# Patient Record
Sex: FEMALE | Race: WHITE | Employment: FULL TIME | URBAN - METROPOLITAN AREA
[De-identification: names, ages, dates, MRNs, and addresses within clinical notes are randomized per-mention and may not be internally consistent; named-entity substitution may affect disease eponyms.]

---

## 2017-02-14 ENCOUNTER — OFFICE VISIT (OUTPATIENT)
Dept: FAMILY MEDICINE CLINIC | Age: 42
End: 2017-02-14

## 2017-02-14 VITALS
BODY MASS INDEX: 22.67 KG/M2 | HEART RATE: 76 BPM | SYSTOLIC BLOOD PRESSURE: 90 MMHG | WEIGHT: 132 LBS | TEMPERATURE: 98.5 F | RESPIRATION RATE: 12 BRPM | DIASTOLIC BLOOD PRESSURE: 60 MMHG

## 2017-02-14 DIAGNOSIS — Z91.89 AT RISK FOR INFECTIOUS DISEASE DUE TO RECENT FOREIGN TRAVEL: ICD-10-CM

## 2017-02-14 DIAGNOSIS — Z23 NEED FOR HEPATITIS A VACCINATION: ICD-10-CM

## 2017-02-14 DIAGNOSIS — G43.009 MIGRAINE WITHOUT AURA AND WITHOUT STATUS MIGRAINOSUS, NOT INTRACTABLE: Primary | ICD-10-CM

## 2017-02-14 DIAGNOSIS — Z23 NEED FOR TYPHUS VACCINATION: ICD-10-CM

## 2017-02-14 PROCEDURE — 90632 HEPA VACCINE ADULT IM: CPT | Performed by: FAMILY MEDICINE

## 2017-02-14 PROCEDURE — 99214 OFFICE O/P EST MOD 30 MIN: CPT | Performed by: FAMILY MEDICINE

## 2017-02-14 PROCEDURE — 90471 IMMUNIZATION ADMIN: CPT | Performed by: FAMILY MEDICINE

## 2017-02-14 RX ORDER — SUMATRIPTAN 100 MG/1
100 TABLET, FILM COATED ORAL
Qty: 9 TABLET | Refills: 12 | Status: SHIPPED | OUTPATIENT
Start: 2017-02-14 | End: 2018-02-25 | Stop reason: SDUPTHER

## 2017-02-14 RX ORDER — KETOROLAC TROMETHAMINE 10 MG/1
10 TABLET, FILM COATED ORAL EVERY 6 HOURS PRN
Qty: 20 TABLET | Refills: 5 | Status: SHIPPED | OUTPATIENT
Start: 2017-02-14 | End: 2018-02-26 | Stop reason: SDUPTHER

## 2017-02-14 RX ORDER — PROCHLORPERAZINE MALEATE 10 MG
10 TABLET ORAL EVERY 6 HOURS PRN
Qty: 20 TABLET | Refills: 5 | Status: SHIPPED | OUTPATIENT
Start: 2017-02-14 | End: 2018-02-26 | Stop reason: SDUPTHER

## 2017-02-14 RX ORDER — CIPROFLOXACIN 500 MG/1
500 TABLET, FILM COATED ORAL 2 TIMES DAILY
Qty: 14 TABLET | Refills: 0 | Status: SHIPPED | OUTPATIENT
Start: 2017-02-14 | End: 2017-02-21

## 2017-02-14 RX ORDER — ATOVAQUONE AND PROGUANIL HYDROCHLORIDE 250; 100 MG/1; MG/1
1 TABLET, FILM COATED ORAL DAILY
Qty: 23 TABLET | Refills: 0 | Status: SHIPPED | OUTPATIENT
Start: 2017-02-14 | End: 2017-05-10 | Stop reason: SDUPTHER

## 2017-05-05 DIAGNOSIS — Z91.89 AT RISK FOR INFECTIOUS DISEASE DUE TO RECENT FOREIGN TRAVEL: ICD-10-CM

## 2017-05-10 RX ORDER — ATOVAQUONE AND PROGUANIL HYDROCHLORIDE 250; 100 MG/1; MG/1
1 TABLET, FILM COATED ORAL DAILY
Qty: 23 TABLET | Refills: 0 | Status: SHIPPED | OUTPATIENT
Start: 2017-05-10 | End: 2017-06-16 | Stop reason: ALTCHOICE

## 2017-06-16 ENCOUNTER — OFFICE VISIT (OUTPATIENT)
Dept: FAMILY MEDICINE CLINIC | Age: 42
End: 2017-06-16

## 2017-06-16 ENCOUNTER — TELEPHONE (OUTPATIENT)
Dept: FAMILY MEDICINE CLINIC | Age: 42
End: 2017-06-16

## 2017-06-16 VITALS
OXYGEN SATURATION: 98 % | SYSTOLIC BLOOD PRESSURE: 115 MMHG | DIASTOLIC BLOOD PRESSURE: 78 MMHG | WEIGHT: 136 LBS | BODY MASS INDEX: 23.36 KG/M2 | HEART RATE: 80 BPM

## 2017-06-16 DIAGNOSIS — R53.83 OTHER FATIGUE: ICD-10-CM

## 2017-06-16 DIAGNOSIS — Z78.9 RECENT FOREIGN TRAVEL: ICD-10-CM

## 2017-06-16 DIAGNOSIS — R42 LIGHTHEADED: Primary | ICD-10-CM

## 2017-06-16 PROCEDURE — 99213 OFFICE O/P EST LOW 20 MIN: CPT | Performed by: FAMILY MEDICINE

## 2018-02-25 DIAGNOSIS — G43.009 MIGRAINE WITHOUT AURA AND WITHOUT STATUS MIGRAINOSUS, NOT INTRACTABLE: ICD-10-CM

## 2018-02-25 RX ORDER — SUMATRIPTAN 100 MG/1
100 TABLET, FILM COATED ORAL
Qty: 9 TABLET | Refills: 12 | Status: SHIPPED | OUTPATIENT
Start: 2018-02-25 | End: 2019-03-11 | Stop reason: SDUPTHER

## 2018-02-26 DIAGNOSIS — G43.009 MIGRAINE WITHOUT AURA AND WITHOUT STATUS MIGRAINOSUS, NOT INTRACTABLE: ICD-10-CM

## 2018-02-26 RX ORDER — SUMATRIPTAN 100 MG/1
100 TABLET, FILM COATED ORAL
Qty: 9 TABLET | Refills: 12 | Status: CANCELLED | OUTPATIENT
Start: 2018-02-26 | End: 2018-02-26

## 2018-02-26 RX ORDER — KETOROLAC TROMETHAMINE 10 MG/1
10 TABLET, FILM COATED ORAL EVERY 6 HOURS PRN
Qty: 20 TABLET | Refills: 1 | Status: SHIPPED | OUTPATIENT
Start: 2018-02-26 | End: 2018-06-26 | Stop reason: SDUPTHER

## 2018-02-26 RX ORDER — PROCHLORPERAZINE MALEATE 10 MG
10 TABLET ORAL EVERY 6 HOURS PRN
Qty: 20 TABLET | Refills: 1 | Status: SHIPPED | OUTPATIENT
Start: 2018-02-26 | End: 2018-06-26 | Stop reason: SDUPTHER

## 2018-05-11 ENCOUNTER — TELEPHONE (OUTPATIENT)
Dept: FAMILY MEDICINE CLINIC | Age: 43
End: 2018-05-11

## 2018-05-11 ENCOUNTER — OFFICE VISIT (OUTPATIENT)
Dept: FAMILY MEDICINE CLINIC | Age: 43
End: 2018-05-11

## 2018-05-11 VITALS
BODY MASS INDEX: 23.91 KG/M2 | TEMPERATURE: 96.8 F | DIASTOLIC BLOOD PRESSURE: 63 MMHG | RESPIRATION RATE: 16 BRPM | HEART RATE: 81 BPM | WEIGHT: 139.2 LBS | OXYGEN SATURATION: 99 % | SYSTOLIC BLOOD PRESSURE: 103 MMHG

## 2018-05-11 DIAGNOSIS — Z23 NEED FOR HEPATITIS A VACCINATION: ICD-10-CM

## 2018-05-11 DIAGNOSIS — Z71.84 TRAVEL ADVICE ENCOUNTER: ICD-10-CM

## 2018-05-11 DIAGNOSIS — L30.9 DERMATITIS: ICD-10-CM

## 2018-05-11 DIAGNOSIS — G43.009 MIGRAINE WITHOUT AURA AND WITHOUT STATUS MIGRAINOSUS, NOT INTRACTABLE: Primary | ICD-10-CM

## 2018-05-11 PROCEDURE — 90471 IMMUNIZATION ADMIN: CPT | Performed by: FAMILY MEDICINE

## 2018-05-11 PROCEDURE — 90632 HEPA VACCINE ADULT IM: CPT | Performed by: FAMILY MEDICINE

## 2018-05-11 PROCEDURE — 99214 OFFICE O/P EST MOD 30 MIN: CPT | Performed by: FAMILY MEDICINE

## 2018-05-11 RX ORDER — NARATRIPTAN 2.5 MG/1
TABLET ORAL
Qty: 12 TABLET | Refills: 5 | Status: SHIPPED | OUTPATIENT
Start: 2018-05-11 | End: 2019-05-20 | Stop reason: SDUPTHER

## 2018-05-11 RX ORDER — BETAMETHASONE DIPROPIONATE 0.5 MG/G
CREAM TOPICAL
Qty: 15 G | Refills: 1 | Status: SHIPPED | OUTPATIENT
Start: 2018-05-11 | End: 2018-06-10

## 2018-05-11 RX ORDER — CIPROFLOXACIN 500 MG/1
500 TABLET, FILM COATED ORAL 2 TIMES DAILY
Qty: 20 TABLET | Refills: 0 | Status: SHIPPED | OUTPATIENT
Start: 2018-05-11 | End: 2018-05-21

## 2018-05-11 RX ORDER — ATOVAQUONE AND PROGUANIL HYDROCHLORIDE 250; 100 MG/1; MG/1
1 TABLET, FILM COATED ORAL DAILY
Qty: 16 TABLET | Refills: 0 | Status: SHIPPED | OUTPATIENT
Start: 2018-05-11 | End: 2018-05-27

## 2019-03-11 DIAGNOSIS — G43.009 MIGRAINE WITHOUT AURA AND WITHOUT STATUS MIGRAINOSUS, NOT INTRACTABLE: ICD-10-CM

## 2019-03-13 RX ORDER — SUMATRIPTAN 100 MG/1
100 TABLET, FILM COATED ORAL
Qty: 9 TABLET | Refills: 1 | Status: SHIPPED | OUTPATIENT
Start: 2019-03-13 | End: 2019-05-20 | Stop reason: SDUPTHER

## 2019-05-20 ENCOUNTER — OFFICE VISIT (OUTPATIENT)
Dept: FAMILY MEDICINE CLINIC | Age: 44
End: 2019-05-20
Payer: COMMERCIAL

## 2019-05-20 VITALS
SYSTOLIC BLOOD PRESSURE: 108 MMHG | WEIGHT: 136.4 LBS | RESPIRATION RATE: 12 BRPM | DIASTOLIC BLOOD PRESSURE: 59 MMHG | BODY MASS INDEX: 23.43 KG/M2 | OXYGEN SATURATION: 98 % | TEMPERATURE: 97.3 F | HEART RATE: 71 BPM

## 2019-05-20 DIAGNOSIS — G43.009 MIGRAINE WITHOUT AURA AND WITHOUT STATUS MIGRAINOSUS, NOT INTRACTABLE: ICD-10-CM

## 2019-05-20 DIAGNOSIS — Z00.00 WELL ADULT EXAM: ICD-10-CM

## 2019-05-20 DIAGNOSIS — F33.41 RECURRENT MAJOR DEPRESSIVE DISORDER, IN PARTIAL REMISSION (HCC): Primary | ICD-10-CM

## 2019-05-20 DIAGNOSIS — M19.041 DEGENERATIVE ARTHRITIS OF FINGER, RIGHT: ICD-10-CM

## 2019-05-20 PROCEDURE — 99214 OFFICE O/P EST MOD 30 MIN: CPT | Performed by: FAMILY MEDICINE

## 2019-05-20 PROCEDURE — G8427 DOCREV CUR MEDS BY ELIG CLIN: HCPCS | Performed by: FAMILY MEDICINE

## 2019-05-20 PROCEDURE — G8420 CALC BMI NORM PARAMETERS: HCPCS | Performed by: FAMILY MEDICINE

## 2019-05-20 PROCEDURE — 1036F TOBACCO NON-USER: CPT | Performed by: FAMILY MEDICINE

## 2019-05-20 RX ORDER — SUMATRIPTAN 100 MG/1
100 TABLET, FILM COATED ORAL
Qty: 9 TABLET | Refills: 12 | Status: SHIPPED | OUTPATIENT
Start: 2019-05-20 | End: 2020-06-10

## 2019-05-20 RX ORDER — PROCHLORPERAZINE MALEATE 10 MG
10 TABLET ORAL EVERY 6 HOURS PRN
Qty: 20 TABLET | Refills: 12 | Status: SHIPPED | OUTPATIENT
Start: 2019-05-20 | End: 2020-08-20 | Stop reason: SDUPTHER

## 2019-05-20 RX ORDER — NARATRIPTAN 2.5 MG/1
TABLET ORAL
Qty: 12 TABLET | Refills: 5 | Status: SHIPPED | OUTPATIENT
Start: 2019-05-20 | End: 2020-08-20 | Stop reason: SDUPTHER

## 2019-05-20 RX ORDER — SUMATRIPTAN 100 MG/1
100 TABLET, FILM COATED ORAL
COMMUNITY
End: 2019-05-20

## 2019-05-20 RX ORDER — KETOROLAC TROMETHAMINE 10 MG/1
10 TABLET, FILM COATED ORAL EVERY 6 HOURS PRN
Qty: 20 TABLET | Refills: 12 | Status: SHIPPED | OUTPATIENT
Start: 2019-05-20 | End: 2020-08-20 | Stop reason: ALTCHOICE

## 2019-05-20 NOTE — PROGRESS NOTES
Subjective:      Patient ID: Eric Carmichael 41 y.o. female. The primary encounter diagnosis was Recurrent major depressive disorder, in partial remission ([address] Diagnoses of Migraine without aura and without status migrainosus, not intractable and Well adult exam were also pertinent to this visit. HPI    Migraines improved with stopping diet coke and exercising more. Taking Amerge preventatively before menses is very effective. Has to take an Imitrex 4 times a month for acute migraine - it works better for acute migraines than Amerge. Sleeps pretty well. Takes Toradol and Compazine if Imitrex is not effective or if has a break through HA with Amerge. Has not had any depression. No appetite or sleep disturbance, no anhedonia, Not suicidal.  Feels well. Has a knot on the right ring finger at the PIP. Mild stiffness. No known trauma or injury. Outpatient Medications Marked as Taking for the 5/20/19 encounter (Office Visit) with Joe Kam MD   Medication Sig Dispense Refill    SUMAtriptan (IMITREX) 100 MG tablet Take 100 mg by mouth once as needed for Migraine      ketorolac (TORADOL) 10 MG tablet TAKE 1 TABLET BY MOUTH EVERY 6 HOURS AS NEEDED FOR PAIN 20 tablet 5    prochlorperazine (COMPAZINE) 10 MG tablet TAKE 1 TABLET BY MOUTH EVERY 6 HOURS AS NEEDED (NAUSEA) 20 tablet 5    naratriptan (AMERGE) 2.5 MG tablet 2.5 mg at onset of HA,repeat in 4 hours if needed andTake 1 po qd x 7 days before menses 12 tablet 5    ibuprofen (ADVIL;MOTRIN) 200 MG CAPS Take 4 capsules by mouth 3 times daily as needed.  aspirin-acetaminophen-caffeine (EXCEDRIN MIGRAINE) 250-250-65 MG per tablet Take 1 tablet by mouth every 6 hours as needed.             Allergies   Allergen Reactions    Topamax [Topiramate]      Joint pain       Patient Active Problem List   Diagnosis    Migraine    Mass of finger of left hand    Depression       Past Medical History:   Diagnosis Date    Depression during divorce    Gestational diabetes 11/18/2011       Past Surgical History:   Procedure Laterality Date    ENDOMETRIAL ABLATION  02/13/2017    Dr. Frank Gilliland        Family History   Problem Relation Age of Onset   [facility] Migraines Mother    L[place] Sos Migraines Sister     Other Father         melanoma    Prostate Cancer Father     Hypertension Mother     Elevated Lipids Mother     Hypertension Father     Elevated Lipids Father        Social History     Tobacco Use    Smoking status: Never Smoker    Smokeless tobacco: Never Used   Substance Use Topics    Alcohol use: Yes     Comment: infrequent    Drug use: No            Review of Systems  Review of Systems    Objective:   Physical Exam  Vitals:    05/20/19 0918   BP: (!) 108/59   Pulse: 71   Resp: 12   Temp: 97.3 °F (36.3 °C)   TempSrc: Oral   SpO2: 98%   Weight: 136 lb 6.4 oz (61.9 kg)       Physical Exam  NAD  Skin is warm and dry. Mood and affect are normal.  No agitation or psychomotor retardation. No pressured speech, grandiosity or tangential thoughts. Insight and judgement are intact. Not suicidal.   The neck is supple and free of adenopathy or masses, the thyroid is normal without enlargement or nodules. Chest is clear, no wheezing or rales. Normal symmetric air entry throughout both lung fields. Heart regular with normal rate, no murmer or gallop     + Herbenden's nodule right fing finger   Assessment:       Diagnosis Orders   1. Recurrent major depressive disorder, in partial remission ([address]  Resolved    2. Migraine without aura and without status migrainosus, not intractable  ketorolac (TORADOL) 10 MG tablet    prochlorperazine (COMPAZINE) 10 MG tablet    naratriptan (AMERGE) 2.5 MG tablet    SUMAtriptan (IMITREX) 100 MG tablet  Well managed   3. Well adult exam  Comprehensive Metabolic Panel    Lipid Panel    CBC    TSH with Reflex   4. Degenerative arthritis of finger, right  Reassured. Topical analgesia.          Side effects of current medications reviewed and questions answered. Follow up in 1 year or prn. Plan:      Follow up in 1 year or prn.

## 2019-10-07 PROBLEM — N20.0 NEPHROLITHIASIS: Status: ACTIVE | Noted: 2019-10-07

## 2019-10-10 ENCOUNTER — TELEPHONE (OUTPATIENT)
Dept: FAMILY MEDICINE CLINIC | Age: 44
End: 2019-10-10

## 2020-08-18 ENCOUNTER — NURSE TRIAGE (OUTPATIENT)
Dept: OTHER | Facility: CLINIC | Age: 45
End: 2020-08-18

## 2020-08-18 NOTE — TELEPHONE ENCOUNTER
Reason for Disposition   [1] MILD swelling of both ankles (i.e., pedal edema) AND [2] new onset or worsening    Answer Assessment - Initial Assessment Questions  1. ONSET: \"When did the swelling start? \" (e.g., minutes, hours, days)      Week ago  2. LOCATION: \"What part of the leg is swollen? \"  \"Are both legs swollen or just one leg? \"      Both legs  3. SEVERITY: \"How bad is the swelling? \" (e.g., localized; mild, moderate, severe)   - Localized - small area of swelling localized to one leg   - MILD pedal edema - swelling limited to foot and ankle, pitting edema < 1/4 inch (6 mm) deep, rest and elevation eliminate most or all swelling   - MODERATE edema - swelling of lower leg to knee, pitting edema > 1/4 inch (6 mm) deep, rest and elevation only partially reduce swelling   - SEVERE edema - swelling extends above knee, facial or hand swelling present       mild  4. REDNESS: \"Does the swelling look red or infected? \"      no  5. PAIN: \"Is the swelling painful to touch? \" If so, ask: \"How painful is it? \"   (Scale 1-10; mild, moderate or severe)      no  6. FEVER: \"Do you have a fever? \" If so, ask: \"What is it, how was it measured, and when did it start? \"       no  7. CAUSE: \"What do you think is causing the leg swelling? \"      Maybe kidney stones  8. MEDICAL HISTORY: \"Do you have a history of heart failure, kidney disease, liver failure, or cancer? \"      Kidney stones   9. RECURRENT SYMPTOM: \"Have you had leg swelling before? \" If so, ask: \"When was the last time? \" \"What happened that time? \"      Yes had bilateral edema in legs May of 2019 then had kidney stones in September   10. OTHER SYMPTOMS: \"Do you have any other symptoms? \" (e.g., chest pain, difficulty breathing)        Lower back pain  11. PREGNANCY: \"Is there any chance you are pregnant? \" \"When was your last menstrual period? \"        n/a    Protocols used: LEG SWELLING AND EDEMA-ADULT-

## 2020-08-20 ENCOUNTER — VIRTUAL VISIT (OUTPATIENT)
Dept: FAMILY MEDICINE CLINIC | Age: 45
End: 2020-08-20
Payer: COMMERCIAL

## 2020-08-20 PROCEDURE — 99214 OFFICE O/P EST MOD 30 MIN: CPT | Performed by: FAMILY MEDICINE

## 2020-08-20 PROCEDURE — G8427 DOCREV CUR MEDS BY ELIG CLIN: HCPCS | Performed by: FAMILY MEDICINE

## 2020-08-20 RX ORDER — PROCHLORPERAZINE MALEATE 10 MG
10 TABLET ORAL EVERY 6 HOURS PRN
Qty: 20 TABLET | Refills: 12 | Status: SHIPPED | OUTPATIENT
Start: 2020-08-20 | End: 2022-08-16

## 2020-08-20 RX ORDER — TRAMADOL HYDROCHLORIDE 50 MG/1
50 TABLET ORAL EVERY 6 HOURS PRN
Qty: 10 TABLET | Refills: 0 | Status: SHIPPED | OUTPATIENT
Start: 2020-08-20 | End: 2020-08-27

## 2020-08-20 RX ORDER — NARATRIPTAN 2.5 MG/1
TABLET ORAL
Qty: 12 TABLET | Refills: 5 | Status: SHIPPED | OUTPATIENT
Start: 2020-08-20 | End: 2021-09-09

## 2020-08-20 NOTE — PROGRESS NOTES
02/12/2016    BILITOT 0.6 02/12/2016    ALKPHOS 57 02/12/2016    AST 11 (L) 02/12/2016    ALT 7 (L) 02/12/2016    LABGLOM >60 02/12/2016    GFRAA >60 02/12/2016    AGRATIO 2.1 02/12/2016    GLOB 2.2 02/12/2016        Lab Results   Component Value Date    WBC 9.8 02/12/2016    HGB 13.7 02/12/2016    HCT 42.8 02/12/2016    MCV 89.9 02/12/2016     02/12/2016     TSH   Date Value Ref Range Status   02/12/2016 0.47 0.27 - 4.20 uIU/mL Final         Review of Systems    Outpatient Medications Marked as Taking for the 8/20/20 encounter (Virtual Visit) with Tracie Mendez MD   Medication Sig Dispense Refill    SUMAtriptan (IMITREX) 100 MG tablet One tablet at the onset of a migraine; repeat in 2 hours prn; max 2 tabs in 24 hours. 9 tablet 5    prochlorperazine (COMPAZINE) 10 MG tablet Take 1 tablet by mouth every 6 hours as needed (nausea) 20 tablet 12    naratriptan (AMERGE) 2.5 MG tablet Take 1 po qd x 7 days before menses 12 tablet 5    ibuprofen (ADVIL;MOTRIN) 200 MG CAPS Take 4 capsules by mouth 3 times daily as needed.  aspirin-acetaminophen-caffeine (EXCEDRIN MIGRAINE) 250-250-65 MG per tablet Take 1 tablet by mouth every 6 hours as needed.             Social History     Tobacco Use    Smoking status: Never Smoker    Smokeless tobacco: Never Used   Substance Use Topics    Alcohol use: Yes     Comment: infrequent    Drug use: No        Allergies   Allergen Reactions    Topamax [Topiramate]      Joint pain   ,   Past Medical History:   Diagnosis Date    Depression     during divorce    Gestational diabetes 11/18/2011   ,   Past Surgical History:   Procedure Laterality Date    ENDOMETRIAL ABLATION  02/13/2017    Dr. Rosamaria Shepherd   ,   Family History   Problem Relation Age of Onset   [facility] Migraines Mother     Migraines Sister     Other Father         melanoma    Prostate Cancer Father     Hypertension Mother     Elevated Lipids Mother     Hypertension Father     Elevated Lipids Father PHYSICAL EXAMINATION:  [ INSTRUCTIONS:  \"[x]\" Indicates a positive item  \"[]\" Indicates a negative item  -- DELETE ALL ITEMS NOT EXAMINED]  Vital Signs: (As obtained by patient/caregiver or practitioner observation)    Blood pressure-  Heart rate-    Respiratory rate-    Temperature-  Pulse oximetry-   Wt 130 lbs    Wt Readings from Last 3 Encounters:   05/20/19 136 lb 6.4 oz (61.9 kg)   05/11/18 139 lb 3.2 oz (63.1 kg)   06/16/17 136 lb (61.7 kg)     Temp Readings from Last 3 Encounters:   05/20/19 97.3 °F (36.3 °C) (Oral)   05/11/18 96.8 °F (36 °C) (Oral)   02/14/17 98.5 °F (36.9 °C) (Oral)     BP Readings from Last 3 Encounters:   05/20/19 (!) 108/59   05/11/18 103/63   06/16/17 115/78     Pulse Readings from Last 3 Encounters:   05/20/19 71   05/11/18 81   06/16/17 80        Constitutional: [x] Appears well-developed and well-nourished [] No apparent distress      [] Abnormal-   Mental status  [x] Alert and awake  [x] Oriented to person/place/time []Able to follow commands      Eyes:  EOM    [x]  Normal  [] Abnormal-  Sclera  []  Normal  [] Abnormal -         Discharge []  None visible  [] Abnormal -    HENT:   [x] Normocephalic, atraumatic.   [] Abnormal   [] Mouth/Throat: Mucous membranes are moist.         Neck: [x] No visualized mass     Pulmonary/Chest: [x] Respiratory effort normal.  [] No visualized signs of difficulty breathing or respiratory distress        [] Abnormal-      Musculoskeletal:   [x] Normal gait with no signs of ataxia   Full AROM back        [] Normal range of motion of neck        [] Abnormal-       Neurological:        [x] No Facial Asymmetry (Cranial nerve 7 motor function) (limited exam to video visit)          [] No gaze palsy        [] Abnormal-         Skin:        [x] No significant exanthematous lesions or discoloration noted on facial skin         [] Abnormal-            Psychiatric:       [x] Normal Affect [] No Hallucinations        [] Abnormal-     Other pertinent observable physical exam findings-     ASSESSMENT/PLAN:  1. Pedal edema  Likely stasis edema. < 2 gm sodium diet, support hose. Dyazide if renal fxn is normal.   - Comprehensive Metabolic Panel; Future    2. Migraine without aura and without status migrainosus, not intractable  Controlled  Controlled Substance Monitoring:    Acute and Chronic Pain Monitoring:   RX Monitoring 8/20/2020   Periodic Controlled Substance Monitoring Possible medication side effects, risk of tolerance/dependence & alternative treatments discussed. ;No signs of potential drug abuse or diversion identified. ;Assessed functional status. ;Obtaining appropriate analgesic effect of treatment. - prochlorperazine (COMPAZINE) 10 MG tablet; Take 1 tablet by mouth every 6 hours as needed (nausea)  Dispense: 20 tablet; Refill: 12  - naratriptan (AMERGE) 2.5 MG tablet; Take 1 po qd x 7 days before menses  Dispense: 12 tablet; Refill: 5  - traMADol (ULTRAM) 50 MG tablet; Take 1 tablet by mouth every 6 hours as needed for Pain for up to 7 days. Dispense: 10 tablet; Refill: 0    3. Well adult exam    - Comprehensive Metabolic Panel; Future  - TSH with Reflex; Future  - Lipid Panel; Future  - CBC; Future    4. Acute midline low back pain without sciatica  Exercise addressed. She will look up lower back stretches and exercise on line. PT if not better in a few weeks or prn worsening. Side effects of current medications reviewed and questions answered. Call or return to clinic prn if these symptoms worsen or fail to improve as anticipated. No follow-ups on file. Starleen Hatchet is a 39 y.o. female being evaluated by a Virtual Visit (video visit) encounter to address concerns as mentioned above. A caregiver was present when appropriate.  Due to this being a TeleHealth encounter (During WJWOX-86 public health emergency), evaluation of the following organ systems was limited: Vitals/Constitutional/EENT/Resp/CV/GI//MS/Neuro/Skin/Heme-Lymph-Imm. Pursuant to the emergency declaration under the [address] [place] [address] and the [employer] and [employer] Act, this Virtual Visit was conducted with patient's (and/or legal guardian's) consent, to reduce the patient's risk of exposure to COVID-19 and provide necessary medical care. The patient (and/or legal guardian) has also been advised to contact this office for worsening conditions or problems, and seek emergency medical treatment and/or call 911 if deemed necessary. Patient identification was verified at the start of the visit: Yes    Total time spent on this encounter: Not billed by time    Services were provided through a video synchronous discussion virtually to substitute for in-person clinic visit. Patient and provider were located at their individual homes. --Dipika Howell MD on 8/19/2020 at 9:22 PM    An electronic signature was used to authenticate this note.

## 2020-08-28 DIAGNOSIS — Z00.00 WELL ADULT EXAM: ICD-10-CM

## 2020-08-28 DIAGNOSIS — R60.0 PEDAL EDEMA: ICD-10-CM

## 2020-08-28 LAB
A/G RATIO: 2 (ref 1.1–2.2)
ALBUMIN SERPL-MCNC: 4.5 G/DL (ref 3.4–5)
ALP BLD-CCNC: 64 U/L (ref 40–129)
ALT SERPL-CCNC: 13 U/L (ref 10–40)
ANION GAP SERPL CALCULATED.3IONS-SCNC: 13 MMOL/L (ref 3–16)
AST SERPL-CCNC: 18 U/L (ref 15–37)
BILIRUB SERPL-MCNC: 0.8 MG/DL (ref 0–1)
BUN BLDV-MCNC: 16 MG/DL (ref 7–20)
CALCIUM SERPL-MCNC: 9.3 MG/DL (ref 8.3–10.6)
CHLORIDE BLD-SCNC: 104 MMOL/L (ref 99–110)
CHOLESTEROL, TOTAL: 246 MG/DL (ref 0–199)
CO2: 23 MMOL/L (ref 21–32)
CREAT SERPL-MCNC: 0.8 MG/DL (ref 0.6–1.1)
GFR AFRICAN AMERICAN: >60
GFR NON-AFRICAN AMERICAN: >60
GLOBULIN: 2.3 G/DL
GLUCOSE BLD-MCNC: 87 MG/DL (ref 70–99)
HCT VFR BLD CALC: 43.5 % (ref 36–48)
HDLC SERPL-MCNC: 65 MG/DL (ref 40–60)
HEMOGLOBIN: 14.4 G/DL (ref 12–16)
LDL CHOLESTEROL CALCULATED: 170 MG/DL
MCH RBC QN AUTO: 30.4 PG (ref 26–34)
MCHC RBC AUTO-ENTMCNC: 33.1 G/DL (ref 31–36)
MCV RBC AUTO: 91.8 FL (ref 80–100)
PDW BLD-RTO: 14.3 % (ref 12.4–15.4)
PLATELET # BLD: 315 K/UL (ref 135–450)
PMV BLD AUTO: 7.9 FL (ref 5–10.5)
POTASSIUM SERPL-SCNC: 5.4 MMOL/L (ref 3.5–5.1)
RBC # BLD: 4.74 M/UL (ref 4–5.2)
SODIUM BLD-SCNC: 140 MMOL/L (ref 136–145)
TOTAL PROTEIN: 6.8 G/DL (ref 6.4–8.2)
TRIGL SERPL-MCNC: 55 MG/DL (ref 0–150)
TSH REFLEX: 0.53 UIU/ML (ref 0.27–4.2)
VLDLC SERPL CALC-MCNC: 11 MG/DL
WBC # BLD: 7.9 K/UL (ref 4–11)

## 2021-01-07 ENCOUNTER — OFFICE VISIT (OUTPATIENT)
Dept: PRIMARY CARE CLINIC | Age: 46
End: 2021-01-07

## 2021-01-07 DIAGNOSIS — Z20.828 EXPOSURE TO SARS-ASSOCIATED CORONAVIRUS: Primary | ICD-10-CM

## 2021-01-07 NOTE — PATIENT INSTRUCTIONS

## 2021-01-07 NOTE — PROGRESS NOTES
Kaitlin Melendrez received a viral test for COVID-19. They were educated on isolation and quarantine as appropriate. For any symptoms, they were directed to seek care from their PCP, given contact information to establish with a doctor, directed to an urgent care or the emergency room.

## 2021-01-08 ENCOUNTER — OFFICE VISIT (OUTPATIENT)
Dept: PRIMARY CARE CLINIC | Age: 46
End: 2021-01-08
Payer: COMMERCIAL

## 2021-01-08 DIAGNOSIS — Z20.822 SUSPECTED COVID-19 VIRUS INFECTION: Primary | ICD-10-CM

## 2021-01-08 LAB — SARS-COV-2, NAA: NOT DETECTED

## 2021-01-08 PROCEDURE — G8428 CUR MEDS NOT DOCUMENT: HCPCS | Performed by: NURSE PRACTITIONER

## 2021-01-08 PROCEDURE — 99211 OFF/OP EST MAY X REQ PHY/QHP: CPT | Performed by: NURSE PRACTITIONER

## 2021-01-08 PROCEDURE — G8421 BMI NOT CALCULATED: HCPCS | Performed by: NURSE PRACTITIONER

## 2021-01-09 LAB — SARS-COV-2, NAA: NOT DETECTED

## 2021-07-28 ENCOUNTER — VIRTUAL VISIT (OUTPATIENT)
Dept: FAMILY MEDICINE CLINIC | Age: 46
End: 2021-07-28
Payer: COMMERCIAL

## 2021-07-28 DIAGNOSIS — Z00.00 WELL ADULT EXAM: ICD-10-CM

## 2021-07-28 DIAGNOSIS — G43.009 MIGRAINE WITHOUT AURA AND WITHOUT STATUS MIGRAINOSUS, NOT INTRACTABLE: Primary | ICD-10-CM

## 2021-07-28 PROCEDURE — 99214 OFFICE O/P EST MOD 30 MIN: CPT | Performed by: FAMILY MEDICINE

## 2021-07-28 PROCEDURE — G8427 DOCREV CUR MEDS BY ELIG CLIN: HCPCS | Performed by: FAMILY MEDICINE

## 2021-07-28 RX ORDER — GALCANEZUMAB 120 MG/ML
120 INJECTION, SOLUTION SUBCUTANEOUS
Qty: 1 PEN | Refills: 5 | Status: SHIPPED | OUTPATIENT
Start: 2021-07-28 | End: 2021-08-17 | Stop reason: SDUPTHER

## 2021-07-28 RX ORDER — DICLOFENAC SODIUM 75 MG/1
75 TABLET, DELAYED RELEASE ORAL 2 TIMES DAILY PRN
Qty: 60 TABLET | Refills: 5 | Status: SHIPPED | OUTPATIENT
Start: 2021-07-28

## 2021-07-28 RX ORDER — PROMETHAZINE HYDROCHLORIDE 25 MG/1
25 TABLET ORAL 4 TIMES DAILY PRN
Qty: 20 TABLET | Refills: 5 | Status: SHIPPED | OUTPATIENT
Start: 2021-07-28

## 2021-07-28 ASSESSMENT — PATIENT HEALTH QUESTIONNAIRE - PHQ9
1. LITTLE INTEREST OR PLEASURE IN DOING THINGS: 0
SUM OF ALL RESPONSES TO PHQ9 QUESTIONS 1 & 2: 0
SUM OF ALL RESPONSES TO PHQ QUESTIONS 1-9: 0
2. FEELING DOWN, DEPRESSED OR HOPELESS: 0

## 2021-07-28 NOTE — PROGRESS NOTES
2021    TELEHEALTH EVALUATION -- Audio/Visual (During GCBHN-91 public health emergency)    HPI:    Mily Arcos (:  1975) has requested an audio/video evaluation for the following concern(s):    The encounter diagnosis was Migraine without aura and without status migrainosus, not intractable. Migraines: currently managed with PRN Amerge and Compazine. Migraines are \"awful'. Having regular menses every 28 d. Has migraines for one week prior to menses. Has to take Amerge every day for 7 days to keep HA at [place] [address]. The past few cycles she has a severe HA with nausea, photophobia and phonophobia. Lasts 3 days. She has migraines at least 12 days a month. Was so severe last week she had to go to Urgent Care and had injection with Benadryl, Toradol, Phenergan to break the HA. Did not tolerated Topamax. Has trouble remembering to take something every day. Hyperlipidemia:  Lipids normally ok; were increased last year. She is exercising 5 days a week. She was on keto diet last years.         Lab Results   Component Value Date     2020    K 5.4 (H) 2020     2020    CO2 23 2020    BUN 16 2020    CREATININE 0.8 2020    GLUCOSE 87 2020    CALCIUM 9.3 2020    PROT 6.8 2020    LABALBU 4.5 2020    BILITOT 0.8 2020    ALKPHOS 64 2020    AST 18 2020    ALT 13 2020    LABGLOM >60 2020    GFRAA >60 2020    AGRATIO 2.0 2020    GLOB 2.3 2020        Lab Results   Component Value Date    WBC 7.9 2020    HGB 14.4 2020    HCT 43.5 2020    MCV 91.8 2020     2020     TSH   Date Value Ref Range Status   2020 0.53 0.27 - 4.20 uIU/mL Final   2016 0.47 0.27 - 4.20 uIU/mL Final     Lab Results   Component Value Date    CHOL 246 (H) 2020    CHOL 183 2012     Lab Results   Component Value Date    TRIG 55 2020    TRIG 94 2012 Lab Results   Component Value Date    HDL 65 (H) 08/28/2020    HDL 40 02/22/2012     Lab Results   Component Value Date    LDLCALC 170 (H) 08/28/2020    LDLCALC 124 (H) 02/22/2012     Lab Results   Component Value Date    LABVLDL 11 08/28/2020    LABVLDL 19 02/22/2012     No results found for: CHOLHDLRATIO      Review of Systems    Outpatient Medications Marked as Taking for the 7/28/21 encounter (Virtual Visit) with Theo Stevens MD   Medication Sig Dispense Refill    prochlorperazine (COMPAZINE) 10 MG tablet Take 1 tablet by mouth every 6 hours as needed (nausea) 20 tablet 12    naratriptan (AMERGE) 2.5 MG tablet Take 1 po qd x 7 days before menses 12 tablet 5    ibuprofen (ADVIL;MOTRIN) 200 MG CAPS Take 4 capsules by mouth 3 times daily as needed.  aspirin-acetaminophen-caffeine (EXCEDRIN MIGRAINE) 250-250-65 MG per tablet Take 1 tablet by mouth every 6 hours as needed.             Social History     Tobacco Use    Smoking status: Never Smoker    Smokeless tobacco: Never Used   Substance Use Topics    Alcohol use: Yes     Comment: infrequent    Drug use: No        Allergies   Allergen Reactions    Topamax [Topiramate]      Joint pain   ,   Past Medical History:   Diagnosis Date    Depression     during divorce    Gestational diabetes 11/18/2011       PHYSICAL EXAMINATION:  [ INSTRUCTIONS:  \"[x]\" Indicates a positive item  \"[]\" Indicates a negative item  -- DELETE ALL ITEMS NOT EXAMINED]  Vital Signs: (As obtained by patient/caregiver or practitioner observation)    Blood pressure-  Heart rate-    Respiratory rate-    Temperature-  Pulse oximetry-   Wt 136 lb     Wt Readings from Last 3 Encounters:   05/20/19 136 lb 6.4 oz (61.9 kg)   05/11/18 139 lb 3.2 oz (63.1 kg)   06/16/17 136 lb (61.7 kg)     Temp Readings from Last 3 Encounters:   05/20/19 97.3 °F (36.3 °C) (Oral)   05/11/18 96.8 °F (36 °C) (Oral)   02/14/17 98.5 °F (36.9 °C) (Oral)     BP Readings from Last 3 Encounters:   05/20/19 (!) 108/59   05/11/18 103/63   06/16/17 115/78     Pulse Readings from Last 3 Encounters:   05/20/19 71   05/11/18 81   06/16/17 80      Constitutional: [x] Appears well-developed and well-nourished [x] No apparent distress      [] Abnormal-   Mental status  [x] Alert and awake  [] Oriented to person/place/time [x]Able to follow commands      Eyes:  EOM    [x]  Normal  [] Abnormal-  Sclera  [x]  Normal  [] Abnormal -         Discharge [x]  None visible  [] Abnormal -    HENT:   [x] Normocephalic, atraumatic. [] Abnormal     Neck: [x] No visualized mass     Pulmonary/Chest: [x] Respiratory effort normal.  [x] No visualized signs of difficulty breathing or respiratory distress        [] Abnormal-       Neurological:        [x] No Facial Asymmetry (Cranial nerve 7 motor function) (limited exam to video visit)             Skin:        [x] No significant exanthematous lesions or discoloration noted on facial skin         [] Abnormal-            Psychiatric:       [x] Normal Affect        [] Abnormal-     Other pertinent observable physical exam findings-     ASSESSMENT/PLAN:  1. Migraine without aura and without status migrainosus, not intractable  Options addressed. If has migraine take Imitrex + Phenergan + Toradol. Life style addressed   - Galcanezumab-gnlm (EMGALITY) 120 MG/ML SOAJ; Inject 120 mg into the skin every 30 days  Dispense: 1 pen; Refill: 5  - diclofenac (VOLTAREN) 75 MG EC tablet; Take 1 tablet by mouth 2 times daily as needed for Pain (migraine)  Dispense: 60 tablet; Refill: 5  - promethazine (PHENERGAN) 25 MG tablet; Take 1 tablet by mouth 4 times daily as needed for Nausea  Dispense: 20 tablet; Refill: 5    2. Well adult exam  Agree with being off keto. Repeat lipids  - Comprehensive Metabolic Panel; Future  - Lipid Panel; Future  - TSH with Reflex; Future  - Hepatitis C Antibody; Future    Side effects of current medications reviewed and questions answered. Follow up in 3 months or prn.    No follow-ups on file. Margarita Rosales, was evaluated through a synchronous (real-time) audio-video encounter. The patient (or guardian if applicable) is aware that this is a billable service. Verbal consent to proceed has been obtained within the past 12 months. The visit was conducted pursuant to the emergency declaration under the [address] [place] [address], [address] [place] [address] and the [employer] and [facility] Act. Patient identification was verified, and a caregiver was present when appropriate. The patient was located in a state where the provider was credentialed to provide care. Total time spent on this encounter: Not billed by time    --Fredrick Stahl MD on 7/27/2021 at 9:35 PM    An electronic signature was used to authenticate this note.

## 2021-08-05 ENCOUNTER — TELEPHONE (OUTPATIENT)
Dept: FAMILY MEDICINE CLINIC | Age: 46
End: 2021-08-05

## 2021-08-05 ENCOUNTER — PATIENT MESSAGE (OUTPATIENT)
Dept: FAMILY MEDICINE CLINIC | Age: 46
End: 2021-08-05

## 2021-08-05 DIAGNOSIS — G43.009 MIGRAINE WITHOUT AURA AND WITHOUT STATUS MIGRAINOSUS, NOT INTRACTABLE: ICD-10-CM

## 2021-08-05 NOTE — TELEPHONE ENCOUNTER
From: Cain Faye  To: Tonya Montanez MD  Sent: 8/5/2021 3:07 PM EDT  Subject: Prescription Question    I recently had an appointment surrounding my migraines and the fact that Imitrex is rarely working anymore. We opted for 2 options: Try a pill combo of the shot combo I have received and try a preventative. I had a migraine and the combo pills did nothing - I repeated the pill combo within 2 hours of taking the first set. Did not help. The Emgality is still in process at the pharmacy - does that mean it is not covered? Can we get something else started? Also, I have had a few aura's lately where my vision becomes pixelated but a migraine does not always follow. Any relief you can help provide would be wonderful! Thanks.   Enmanuel Colunga

## 2021-08-06 NOTE — TELEPHONE ENCOUNTER
PA submitted via CM for Emgality 120MG/ML auto-injectors (migraine).   Key: [id number] - PA Case ID: [id number]    STATUS: PENDING

## 2021-08-10 NOTE — TELEPHONE ENCOUNTER
Received APPROVAL for Emgality 120MG/ML auto-injectors (migraine) through 02/06/2022. Approval letter attached. Please advise patient. Thank you!

## 2021-08-17 RX ORDER — GALCANEZUMAB 120 MG/ML
120 INJECTION, SOLUTION SUBCUTANEOUS
Qty: 1 PEN | Refills: 5 | Status: SHIPPED | OUTPATIENT
Start: 2021-08-17 | End: 2022-02-01

## 2021-08-17 NOTE — TELEPHONE ENCOUNTER
Emgality approved    Needs resent to pharmacy    Requested Prescriptions     Pending Prescriptions Disp Refills    Galcanezumab-gnlm (EMGALITY) 120 MG/ML SOAJ 1 pen 5     Sig: Inject 120 mg into the skin every 30 days

## 2021-12-06 DIAGNOSIS — G43.009 MIGRAINE WITHOUT AURA AND WITHOUT STATUS MIGRAINOSUS, NOT INTRACTABLE: ICD-10-CM

## 2021-12-06 RX ORDER — SUMATRIPTAN 100 MG/1
TABLET, FILM COATED ORAL
Qty: 9 TABLET | Refills: 12 | Status: SHIPPED | OUTPATIENT
Start: 2021-12-06

## 2021-12-06 NOTE — TELEPHONE ENCOUNTER
Requested Prescriptions     Pending Prescriptions Disp Refills    SUMAtriptan (IMITREX) 100 MG tablet [Pharmacy Med Name: SUMATRIPTAN SUCC 100 MG TABLET] 9 tablet 12     Sig: ONE TABLET AT THE ONSET OF A MIGRAINE REPEAT IN 2 HOURS AS NEEDED MAX 2 TABS IN 24 HOURS.     7/28/2021  Last ov 7/28/2021

## 2022-02-01 DIAGNOSIS — G43.009 MIGRAINE WITHOUT AURA AND WITHOUT STATUS MIGRAINOSUS, NOT INTRACTABLE: ICD-10-CM

## 2022-02-01 RX ORDER — GALCANEZUMAB 120 MG/ML
120 INJECTION, SOLUTION SUBCUTANEOUS
Qty: 1 PEN | Refills: 5 | Status: SHIPPED | OUTPATIENT
Start: 2022-02-01 | End: 2022-08-08

## 2022-02-01 NOTE — TELEPHONE ENCOUNTER
Requested Prescriptions     Pending Prescriptions Disp Refills    EMGALITY 120 MG/ML SOAJ [Pharmacy Med Name: EMGALITY 120 MG/ML PEN]  5     Sig: INJECT 120 MG INTO THE SKIN EVERY 30 DAYS     Last ov 7/28/21 vv  Last lab 7/28/21

## 2022-03-11 ENCOUNTER — TELEPHONE (OUTPATIENT)
Dept: FAMILY MEDICINE CLINIC | Age: 47
End: 2022-03-11

## 2022-03-11 NOTE — TELEPHONE ENCOUNTER
Patient called stating that there was a PA request sent for her Emgality 120 mg faxed to the office but there is no record of that in her chart. She says she is almost completely out of the medication and doesn't know if there will be any side effects if she misses any doses. She would like a call back concerning this matter.     [phone] (home)

## 2022-03-14 ENCOUNTER — TELEPHONE (OUTPATIENT)
Dept: ADMINISTRATIVE | Age: 47
End: 2022-03-14

## 2022-03-14 NOTE — TELEPHONE ENCOUNTER
Submitted PA for [facility]  Via CMM Key: [id number] STATUS: APPROVED effective  03/14/2022-03/14/2023    Please notify patient.  Thank you

## 2022-03-21 NOTE — TELEPHONE ENCOUNTER
Pt stated she called pharmacy and pharmacy stated they received no notification of approval of the medication. Please advise.

## 2022-08-08 DIAGNOSIS — G43.009 MIGRAINE WITHOUT AURA AND WITHOUT STATUS MIGRAINOSUS, NOT INTRACTABLE: ICD-10-CM

## 2022-08-08 RX ORDER — GALCANEZUMAB 120 MG/ML
120 INJECTION, SOLUTION SUBCUTANEOUS
Qty: 1 PEN | Refills: 0 | Status: SHIPPED | OUTPATIENT
Start: 2022-08-08 | End: 2022-08-25 | Stop reason: SDUPTHER

## 2022-08-08 NOTE — TELEPHONE ENCOUNTER
[phone] (home)   Patient called. No answer. LMOM for the patient to call and schedule an appointment. Sent [facility] also.

## 2022-08-08 NOTE — TELEPHONE ENCOUNTER
Requested Prescriptions     Pending Prescriptions Disp Refills    EMGALITY 120 MG/ML SOAJ [Pharmacy Med Name: EMGALITY 120 MG/ML PEN]  5     Sig: INJECT 120 MG INTO THE SKIN EVERY 30 DAYS     Last OV; 7/28/2021  Last labs; 7/28/2021  F/U 8/18/2022

## 2022-08-21 NOTE — PROGRESS NOTES
Subjective:        2022    TELEHEALTH EVALUATION -- Audio/Visual (During PTSYS-61 public health emergency)    HPI:    Derrell Hudson (:  1975) has requested an audio/video evaluation for the following concern(s):    The primary encounter diagnosis was Migraine without aura and without status migrainosus, not intractable. Diagnoses of Colon cancer screening, Need for Tdap vaccination, and Well adult exam were also pertinent to this visit. PAP: per gyne. Last done a few years ago. She will call to schedule   Colon cancer: due initial screening. FH is neg for polyps and colon cancer. Vaccines: due TDAP and COVID booster. Labs: due hep C. Migraines:  controlled with Emgality, Diclofenac, Phenergan, Imitrex (for breakthrough) and Amerge before menses. Much better. Has a migraine 6 - 8 times a month. For the first time in years she has not run out of Imitrex. She still has occ severe migraines and cannot always get the migraines under control when they occur. She takes Voltaren, Phenergan, Imitrex or Amerge when gets a migraine and still can have a HA for 6-8 hours. Menses are a bit irregular - coming about 5 days later than typical.    Elevated cholesterol: in  LDL was much higher than previous, likely due to keto diet.       Review of Systems    Outpatient Medications Marked as Taking for the 22 encounter (Telemedicine) with Nelly Kunz MD   Medication Sig Dispense Refill    Galcanezumab-gnlm (EMGALITY) 120 MG/ML SOAJ INJECT 120 MG INTO THE SKIN EVERY 30 DAYS 1 pen 0    SUMAtriptan (IMITREX) 100 MG tablet ONE TABLET AT THE ONSET OF A MIGRAINE REPEAT IN 2 HOURS AS NEEDED MAX 2 TABS IN 24 HOURS. 9 tablet 12    naratriptan (AMERGE) 2.5 MG tablet TAKE 1 TABLET BY MOUTH EVERY DAY FOR 7 DAYS BEFORE MENSES 12 tablet 12    diclofenac (VOLTAREN) 75 MG EC tablet Take 1 tablet by mouth 2 times daily as needed for Pain (migraine) 60 tablet 5    promethazine (PHENERGAN) 25 MG tablet [x] Respiratory effort normal.  [x] No visualized signs of difficulty breathing or respiratory distress        [] Abnormal-     Psychiatric:       [x] Normal Affect [x] No Hallucinations        [] Abnormal-     Other pertinent observable physical exam findings-     ASSESSMENT/PLAN:  1. Migraine without aura and without status migrainosus, not intractable  Improved but still not controlled. Try Nurtec, if effective consider stopping Emgality and switching to QOD Nurtec  - Galcanezumab-gnlm (EMGALITY) 120 MG/ML SOAJ; Inject 120 mg into the skin every 30 days  Dispense: 1 pen; Refill: 12    2. Colon cancer screening  Colonoscopy risks and benefits addressed and she agrees to schedule. - Carly Lopez MD, Gastroenterology, [place]    3. Need for Tdap vaccination  Will do at pharmacy or nurse visit    4. Well adult exam    - Hepatitis C Antibody; Future  - Comprehensive Metabolic Panel; Future  - CBC; Future  - TSH with Reflex; Future  - Lipid Panel; Future    5. Intractable migraine without aura and without status migrainosus    - Rimegepant Sulfate (NURTEC) 75 MG TBDP; Take 75 mg by mouth daily as needed (migraine)  Dispense: 8 tablet; Refill: 12      Side effects of current medications reviewed and questions answered. Follow up in 1 year or prn. No follow-ups on file. Marcos Ferreira, was evaluated through a synchronous (real-time) audio-video encounter. The patient (or guardian if applicable) is aware that this is a billable service, which includes applicable co-pays. This Virtual Visit was conducted with patient's (and/or legal guardian's) consent. The visit was conducted pursuant to the emergency declaration under the [facility], [address] [place] [address] [facility] authority and the [employer] General Act. Patient identification was verified, and a caregiver was present when appropriate.    The patient was located at Home: [facility] [address] [facility] [address]. Provider was located at Home ([place] [address]): [place]. Total time spent on this encounter: Not billed by time    --Galo Johnson MD on 8/21/2022 at 7:46 PM    An electronic signature was used to authenticate this note.

## 2022-08-25 ENCOUNTER — TELEMEDICINE (OUTPATIENT)
Dept: FAMILY MEDICINE CLINIC | Age: 47
End: 2022-08-25
Payer: COMMERCIAL

## 2022-08-25 DIAGNOSIS — Z23 NEED FOR TDAP VACCINATION: ICD-10-CM

## 2022-08-25 DIAGNOSIS — G43.009 MIGRAINE WITHOUT AURA AND WITHOUT STATUS MIGRAINOSUS, NOT INTRACTABLE: Primary | ICD-10-CM

## 2022-08-25 DIAGNOSIS — Z12.11 COLON CANCER SCREENING: ICD-10-CM

## 2022-08-25 DIAGNOSIS — Z00.00 WELL ADULT EXAM: ICD-10-CM

## 2022-08-25 DIAGNOSIS — G43.019 INTRACTABLE MIGRAINE WITHOUT AURA AND WITHOUT STATUS MIGRAINOSUS: ICD-10-CM

## 2022-08-25 PROCEDURE — 99214 OFFICE O/P EST MOD 30 MIN: CPT | Performed by: FAMILY MEDICINE

## 2022-08-25 PROCEDURE — G8427 DOCREV CUR MEDS BY ELIG CLIN: HCPCS | Performed by: FAMILY MEDICINE

## 2022-08-25 RX ORDER — ACETAMINOPHEN, ASPIRIN AND CAFFEINE 250; 250; 65 MG/1; MG/1; MG/1
1 TABLET, FILM COATED ORAL EVERY 6 HOURS PRN
Qty: 90 TABLET | Status: CANCELLED | OUTPATIENT
Start: 2022-08-25

## 2022-08-25 RX ORDER — GALCANEZUMAB 120 MG/ML
120 INJECTION, SOLUTION SUBCUTANEOUS
Qty: 1 PEN | Refills: 12 | Status: SHIPPED | OUTPATIENT
Start: 2022-08-25

## 2022-08-25 RX ORDER — RIMEGEPANT SULFATE 75 MG/75MG
75 TABLET, ORALLY DISINTEGRATING ORAL DAILY PRN
Qty: 8 TABLET | Refills: 12 | Status: SHIPPED | OUTPATIENT
Start: 2022-08-25

## 2022-08-25 SDOH — ECONOMIC STABILITY: FOOD INSECURITY: WITHIN THE PAST 12 MONTHS, YOU WORRIED THAT YOUR FOOD WOULD RUN OUT BEFORE YOU GOT MONEY TO BUY MORE.: NEVER TRUE

## 2022-08-25 SDOH — ECONOMIC STABILITY: FOOD INSECURITY: WITHIN THE PAST 12 MONTHS, THE FOOD YOU BOUGHT JUST DIDN'T LAST AND YOU DIDN'T HAVE MONEY TO GET MORE.: NEVER TRUE

## 2022-08-25 ASSESSMENT — PATIENT HEALTH QUESTIONNAIRE - PHQ9
2. FEELING DOWN, DEPRESSED OR HOPELESS: 0
SUM OF ALL RESPONSES TO PHQ QUESTIONS 1-9: 0
1. LITTLE INTEREST OR PLEASURE IN DOING THINGS: 0
SUM OF ALL RESPONSES TO PHQ9 QUESTIONS 1 & 2: 0
SUM OF ALL RESPONSES TO PHQ QUESTIONS 1-9: 0

## 2022-08-25 ASSESSMENT — SOCIAL DETERMINANTS OF HEALTH (SDOH): HOW HARD IS IT FOR YOU TO PAY FOR THE VERY BASICS LIKE FOOD, HOUSING, MEDICAL CARE, AND HEATING?: NOT HARD AT ALL

## 2022-08-31 ENCOUNTER — TELEPHONE (OUTPATIENT)
Dept: FAMILY MEDICINE CLINIC | Age: 47
End: 2022-08-31

## 2022-09-01 NOTE — TELEPHONE ENCOUNTER
Submitted PA for Nurtec 75MG dispersible tablets. Key: [id number]. Via Replaced by [facility] STATUS: This medication or product was previously approved on [id number] from 2022-08-30 to 2023-08-30. **Please note: This request was submitted electronically. Formulary lowering, tiering exception, cost reduction and/or pre-benefit determination review (including prospective Medicare hospice reviews) requests cannot be requested using this method of submission. Providers contact us at [phone] for further assistance. If this requires a response please respond to the pool ( [facility] [address]). Thank you please advise patient.

## 2022-10-05 ENCOUNTER — TELEPHONE (OUTPATIENT)
Dept: FAMILY MEDICINE CLINIC | Age: 47
End: 2022-10-05

## 2022-12-08 DIAGNOSIS — G43.009 MIGRAINE WITHOUT AURA AND WITHOUT STATUS MIGRAINOSUS, NOT INTRACTABLE: ICD-10-CM

## 2022-12-08 RX ORDER — SUMATRIPTAN 100 MG/1
TABLET, FILM COATED ORAL
Qty: 9 TABLET | Refills: 5 | Status: SHIPPED | OUTPATIENT
Start: 2022-12-08 | End: 2023-01-12 | Stop reason: SDUPTHER

## 2022-12-08 NOTE — TELEPHONE ENCOUNTER
Requested Prescriptions     Pending Prescriptions Disp Refills    SUMAtriptan (IMITREX) 100 MG tablet [Pharmacy Med Name: SUMATRIPTAN SUCC 100 MG TABLET] 9 tablet 12     Sig: TAKE ONE TABLET AT THE ONSET OF A MIGRAINE REPEAT IN 2 HOURS AS NEEDED MAX 2 TABS IN 24 HOURS.          Last OV; 8/25/2022   Last labs; 8/28/2020  No F/u

## 2023-01-05 DIAGNOSIS — Z00.00 WELL ADULT EXAM: ICD-10-CM

## 2023-01-05 LAB
A/G RATIO: 1.5 (ref 1.1–2.2)
ALBUMIN SERPL-MCNC: 4 G/DL (ref 3.4–5)
ALP BLD-CCNC: 72 U/L (ref 40–129)
ALT SERPL-CCNC: 15 U/L (ref 10–40)
ANION GAP SERPL CALCULATED.3IONS-SCNC: 12 MMOL/L (ref 3–16)
AST SERPL-CCNC: 16 U/L (ref 15–37)
BILIRUB SERPL-MCNC: 0.3 MG/DL (ref 0–1)
BUN BLDV-MCNC: 12 MG/DL (ref 7–20)
CALCIUM SERPL-MCNC: 9.3 MG/DL (ref 8.3–10.6)
CHLORIDE BLD-SCNC: 103 MMOL/L (ref 99–110)
CHOLESTEROL, TOTAL: 232 MG/DL (ref 0–199)
CO2: 22 MMOL/L (ref 21–32)
CREAT SERPL-MCNC: 0.7 MG/DL (ref 0.6–1.1)
GFR SERPL CREATININE-BSD FRML MDRD: >60 ML/MIN/{1.73_M2}
GLUCOSE BLD-MCNC: 86 MG/DL (ref 70–99)
HCT VFR BLD CALC: 40.8 % (ref 36–48)
HDLC SERPL-MCNC: 57 MG/DL (ref 40–60)
HEMOGLOBIN: 13.5 G/DL (ref 12–16)
HEPATITIS C ANTIBODY INTERPRETATION: NORMAL
LDL CHOLESTEROL CALCULATED: 164 MG/DL
MCH RBC QN AUTO: 29.6 PG (ref 26–34)
MCHC RBC AUTO-ENTMCNC: 33.1 G/DL (ref 31–36)
MCV RBC AUTO: 89.4 FL (ref 80–100)
PDW BLD-RTO: 13.5 % (ref 12.4–15.4)
PLATELET # BLD: 307 K/UL (ref 135–450)
PMV BLD AUTO: 8.5 FL (ref 5–10.5)
POTASSIUM SERPL-SCNC: 4.9 MMOL/L (ref 3.5–5.1)
RBC # BLD: 4.56 M/UL (ref 4–5.2)
SODIUM BLD-SCNC: 137 MMOL/L (ref 136–145)
TOTAL PROTEIN: 6.6 G/DL (ref 6.4–8.2)
TRIGL SERPL-MCNC: 57 MG/DL (ref 0–150)
TSH REFLEX: 0.55 UIU/ML (ref 0.27–4.2)
VLDLC SERPL CALC-MCNC: 11 MG/DL
WBC # BLD: 8.1 K/UL (ref 4–11)

## 2023-01-09 NOTE — PROGRESS NOTES
Subjective:      Patient ID: Vivian Judd 41 y.o. female. The primary encounter diagnosis was Intractable migraine without aura and without status migrainosus. Diagnoses of Need for vaccination, Colon cancer screening, and Pure hypercholesterolemia were also pertinent to this visit. HPI     PAP:  due  Vaccines: Due COVID, TDAP, flu  Colon cancer screen: has referral to Dr. Nataly Melton. Has appt in March    Migraines:  using Emgality for prevention and Nurtec for acute treatment. Frequency of migraines: before menses; much milder since on Emgality. Effectiveness of Nurtec: does not always work;  takes Imitrex if needed and works. Hyperlipidemia: elevated the last 2 tests; previously normal.   Diet: does not eat as well as she should. Grazes a lot. Exercise: [employer] 2 to 3 times a week. FH: parents have hyperlipidemia.      The 10-year ASCVD risk score (Yen BEARD, et al., 2019) is: 0.6%    Values used to calculate the score:      Age: 41 years      Sex: Female      Is Non- : No      Diabetic: No      Tobacco smoker: No      Systolic Blood Pressure: 249 mmHg      Is BP treated: No      HDL Cholesterol: 57 mg/dL      Total Cholesterol: 232 mg/dL     Lab Results   Component Value Date     01/05/2023    K 4.9 01/05/2023     01/05/2023    CO2 22 01/05/2023    BUN 12 01/05/2023    CREATININE 0.7 01/05/2023    GLUCOSE 86 01/05/2023    CALCIUM 9.3 01/05/2023    PROT 6.6 01/05/2023    LABALBU 4.0 01/05/2023    BILITOT 0.3 01/05/2023    ALKPHOS 72 01/05/2023    AST 16 01/05/2023    ALT 15 01/05/2023    LABGLOM >60 01/05/2023    GFRAA >60 08/28/2020    AGRATIO 1.5 01/05/2023    GLOB 2.3 08/28/2020        Lab Results   Component Value Date    WBC 8.1 01/05/2023    HGB 13.5 01/05/2023    HCT 40.8 01/05/2023    MCV 89.4 01/05/2023     01/05/2023     TSH   Date Value Ref Range Status   01/05/2023 0.55 0.27 - 4.20 uIU/mL Final   08/28/2020 0.53 0.27 - 4.20 uIU/mL Final   02/12/2016 0.47 0.27 - 4.20 uIU/mL Final     Lab Results   Component Value Date    CHOL 232 (H) 01/05/2023    CHOL 246 (H) 08/28/2020    CHOL 183 02/22/2012     Lab Results   Component Value Date    TRIG 57 01/05/2023    TRIG 55 08/28/2020    TRIG 94 02/22/2012     Lab Results   Component Value Date    HDL 57 01/05/2023    HDL 65 (H) 08/28/2020    HDL 40 02/22/2012     Lab Results   Component Value Date    LDLCALC 164 (H) 01/05/2023    LDLCALC 170 (H) 08/28/2020    LDLCALC 124 (H) 02/22/2012     Lab Results   Component Value Date    LABVLDL 11 01/05/2023    LABVLDL 11 08/28/2020    LABVLDL 19 02/22/2012     No results found for: CHOLHDLRATIO         Outpatient Medications Marked as Taking for the 1/12/23 encounter (Office Visit) with Jules Sheridan MD   Medication Sig Dispense Refill    SUMAtriptan (IMITREX) 100 MG tablet TAKE ONE TABLET AT THE ONSET OF A MIGRAINE REPEAT IN 2 HOURS AS NEEDED MAX 2 TABS IN 24 HOURS. 9 tablet 5    Galcanezumab-gnlm (EMGALITY) 120 MG/ML SOAJ Inject 120 mg into the skin every 30 days 1 pen 12    Rimegepant Sulfate (NURTEC) 75 MG TBDP Take 75 mg by mouth daily as needed (migraine) 8 tablet 12    ibuprofen (ADVIL;MOTRIN) 200 MG CAPS Take 4 capsules by mouth 3 times daily as needed. aspirin-acetaminophen-caffeine (EXCEDRIN MIGRAINE) 250-250-65 MG per tablet Take 1 tablet by mouth every 6 hours as needed.             Allergies   Allergen Reactions    Topamax [Topiramate]      Joint pain       Patient Active Problem List   Diagnosis    Migraine    Mass of finger of left hand    Nephrolithiasis       Past Medical History:   Diagnosis Date    Depression     during divorce    Gestational diabetes 11/18/2011       Past Surgical History:   Procedure Laterality Date    ENDOMETRIAL ABLATION  02/13/2017    Dr. Niesha Ibarra        Family History   Problem Relation Age of Onset    Migraines Mother     Migraines Sister     Other Father         melanoma    Prostate Cancer Father Hypertension Mother     Elevated Lipids Mother     Hypertension Father     Elevated Lipids Father        Social History     Tobacco Use    Smoking status: Never    Smokeless tobacco: Never   Substance Use Topics    Alcohol use: Yes     Comment: infrequent    Drug use: No            Review of Systems  Review of Systems    Objective:   Physical Exam  Vitals:    01/12/23 1014   BP: 100/70   Pulse: 66   Resp: 14   Temp: 98.3 °F (36.8 °C)   TempSrc: Temporal   SpO2: 99%   Weight: 149 lb 12.8 oz (67.9 kg)   Height: 5' 4\" (1.626 m)     Wt Readings from Last 3 Encounters:   01/12/23 149 lb 12.8 oz (67.9 kg)   05/20/19 136 lb 6.4 oz (61.9 kg)   05/11/18 139 lb 3.2 oz (63.1 kg)        Physical Exam    NAD    Skin is warm and dry. No rash. Well hydrated  Alert and oriented x 3. Mood and affect are normal.  The neck is supple and free of adenopathy or masses, the thyroid is normal without enlargement or nodules. Chest: clear with no wheezes or rales. No retractions, or use of accessory muscles noted. Cardiovascular: PMI is not displaced, and no thrill noted. Regular rate and rhythm with no rub, murmur or gallop. No peripheral edema. Assessment:       Diagnosis Orders   1. Intractable migraine without aura and without status migrainosus  Rimegepant Sulfate (NURTEC) 75 MG TBDP      2. Migraine without aura and without status migrainosus, not intractable  Galcanezumab-gnlm (EMGALITY) 120 MG/ML SOAJ    SUMAtriptan (IMITREX) 100 MG tablet    Well controlled       3. Pure hypercholesterolemia  Low CAD risk  Discussed diet, exercise strategies       4. Need for vaccination  Tdap, BOOSTRIX, (age 8 yrs+), IM  Encouraged COVID booster      5. Colon cancer screening  As planned      6. PAP per gyne. Plan:      Side effects of current medications reviewed and questions answered. Follow up in 1 year or prn.

## 2023-01-12 ENCOUNTER — OFFICE VISIT (OUTPATIENT)
Dept: FAMILY MEDICINE CLINIC | Age: 48
End: 2023-01-12
Payer: COMMERCIAL

## 2023-01-12 VITALS
TEMPERATURE: 98.3 F | HEIGHT: 64 IN | DIASTOLIC BLOOD PRESSURE: 70 MMHG | WEIGHT: 149.8 LBS | SYSTOLIC BLOOD PRESSURE: 100 MMHG | OXYGEN SATURATION: 99 % | HEART RATE: 66 BPM | BODY MASS INDEX: 25.57 KG/M2 | RESPIRATION RATE: 14 BRPM

## 2023-01-12 DIAGNOSIS — Z23 NEED FOR VACCINATION: ICD-10-CM

## 2023-01-12 DIAGNOSIS — G43.009 MIGRAINE WITHOUT AURA AND WITHOUT STATUS MIGRAINOSUS, NOT INTRACTABLE: ICD-10-CM

## 2023-01-12 DIAGNOSIS — E78.00 PURE HYPERCHOLESTEROLEMIA: ICD-10-CM

## 2023-01-12 DIAGNOSIS — G43.019 INTRACTABLE MIGRAINE WITHOUT AURA AND WITHOUT STATUS MIGRAINOSUS: Primary | ICD-10-CM

## 2023-01-12 DIAGNOSIS — Z12.11 COLON CANCER SCREENING: ICD-10-CM

## 2023-01-12 PROCEDURE — G8484 FLU IMMUNIZE NO ADMIN: HCPCS | Performed by: FAMILY MEDICINE

## 2023-01-12 PROCEDURE — G8427 DOCREV CUR MEDS BY ELIG CLIN: HCPCS | Performed by: FAMILY MEDICINE

## 2023-01-12 PROCEDURE — G8419 CALC BMI OUT NRM PARAM NOF/U: HCPCS | Performed by: FAMILY MEDICINE

## 2023-01-12 PROCEDURE — 1036F TOBACCO NON-USER: CPT | Performed by: FAMILY MEDICINE

## 2023-01-12 PROCEDURE — 90471 IMMUNIZATION ADMIN: CPT | Performed by: FAMILY MEDICINE

## 2023-01-12 PROCEDURE — 99214 OFFICE O/P EST MOD 30 MIN: CPT | Performed by: FAMILY MEDICINE

## 2023-01-12 PROCEDURE — 90715 TDAP VACCINE 7 YRS/> IM: CPT | Performed by: FAMILY MEDICINE

## 2023-01-12 RX ORDER — SUMATRIPTAN 100 MG/1
TABLET, FILM COATED ORAL
Qty: 9 TABLET | Refills: 5 | Status: SHIPPED | OUTPATIENT
Start: 2023-01-12

## 2023-01-12 RX ORDER — GALCANEZUMAB 120 MG/ML
120 INJECTION, SOLUTION SUBCUTANEOUS
Qty: 3 ADJUSTABLE DOSE PRE-FILLED PEN SYRINGE | Refills: 3 | Status: SHIPPED | OUTPATIENT
Start: 2023-01-12

## 2023-01-12 RX ORDER — RIMEGEPANT SULFATE 75 MG/75MG
75 TABLET, ORALLY DISINTEGRATING ORAL DAILY PRN
Qty: 8 TABLET | Refills: 12 | Status: SHIPPED | OUTPATIENT
Start: 2023-01-12

## 2023-01-12 ASSESSMENT — PATIENT HEALTH QUESTIONNAIRE - PHQ9
SUM OF ALL RESPONSES TO PHQ QUESTIONS 1-9: 0
1. LITTLE INTEREST OR PLEASURE IN DOING THINGS: 0
2. FEELING DOWN, DEPRESSED OR HOPELESS: 0
SUM OF ALL RESPONSES TO PHQ9 QUESTIONS 1 & 2: 0
SUM OF ALL RESPONSES TO PHQ QUESTIONS 1-9: 0

## 2023-02-22 ENCOUNTER — PATIENT MESSAGE (OUTPATIENT)
Dept: FAMILY MEDICINE CLINIC | Age: 48
End: 2023-02-22

## 2023-02-22 NOTE — LETTER
[facility]  [address] [place] [address] [address] [address]  Phone: [phone]  Fax: [phone]    uJles Sheridan MD        February 22, 2023     Patient: Robert Brittle   YOB: 1975   Date of Visit: 2/22/2023       To Whom It May Concern:    I am writing to appeal denial of Emgality for migraine prevention. Joaquin Lovell has successfully used this medication since July 2021. It was denied for \"The medication will not be used in combination with any other calcitonin, gene-related peptide antagonist or inhibitor used for the preventive treatment of Migraines. \"  Joaquin Lovlel is taking no other medications for migraine prevention. She uses Sumatriptan for breakthrough migraines; typically this is just with her menses. I respectfully request review of this denial so Joaquin Lovell can continue to use Emgality for migraine prevention. If you have any questions or concerns, please don't hesitate to call.     Sincerely,        Jules Sheridan MD

## 2023-02-22 NOTE — TELEPHONE ENCOUNTER
From: Viri Shafer  To: Dr. Atkins List: 2/22/2023 12:46 PM EST  Subject: Emgality Authorization Denied    I got a letter in the mail stating that my Emgality coverage is denied due to: The medication will not be used in combination with any other calcitonin, gene-related peptide antagonist or inhibitor used for the preventive treatment of Migraines. I am NOT taking the Nurtec - I went to fill the prescription and the cost to me was extremely high. Is there any want to take that off as one of my medications and resubmit the request so I don't get denied. This is the first medication since 7th grade that has worked for me and I really need this medication and do not want to start over on my migraine journey! Thanks.   Eliezer Dickson

## 2023-02-27 ENCOUNTER — TELEPHONE (OUTPATIENT)
Dept: ADMINISTRATIVE | Age: 48
End: 2023-02-27

## 2023-03-16 ENCOUNTER — HOSPITAL ENCOUNTER (OUTPATIENT)
Age: 48
Setting detail: OUTPATIENT SURGERY
Discharge: HOME | End: 2023-03-16
Attending: INTERNAL MEDICINE | Admitting: INTERNAL MEDICINE
Payer: COMMERCIAL

## 2023-03-16 ENCOUNTER — ANESTHESIA EVENT (OUTPATIENT)
Dept: ENDOSCOPY | Age: 48
End: 2023-03-16
Payer: COMMERCIAL

## 2023-03-16 ENCOUNTER — ANESTHESIA (OUTPATIENT)
Dept: ENDOSCOPY | Age: 48
End: 2023-03-16
Payer: COMMERCIAL

## 2023-03-16 VITALS
HEART RATE: 72 BPM | RESPIRATION RATE: 16 BRPM | HEIGHT: 64 IN | SYSTOLIC BLOOD PRESSURE: 102 MMHG | BODY MASS INDEX: 23.9 KG/M2 | WEIGHT: 140 LBS | OXYGEN SATURATION: 100 % | TEMPERATURE: 97.8 F | DIASTOLIC BLOOD PRESSURE: 65 MMHG

## 2023-03-16 DIAGNOSIS — Z12.11 COLON CANCER SCREENING: ICD-10-CM

## 2023-03-16 LAB — HCG UR QL: NEGATIVE

## 2023-03-16 PROCEDURE — 3700000000 HC ANESTHESIA ATTENDED CARE: Performed by: INTERNAL MEDICINE

## 2023-03-16 PROCEDURE — 6360000002 HC RX W HCPCS

## 2023-03-16 PROCEDURE — 7100000011 HC PHASE II RECOVERY - ADDTL 15 MIN: Performed by: INTERNAL MEDICINE

## 2023-03-16 PROCEDURE — 88305 TISSUE EXAM BY PATHOLOGIST: CPT

## 2023-03-16 PROCEDURE — 84703 CHORIONIC GONADOTROPIN ASSAY: CPT

## 2023-03-16 PROCEDURE — 3700000001 HC ADD 15 MINUTES (ANESTHESIA): Performed by: INTERNAL MEDICINE

## 2023-03-16 PROCEDURE — 2500000003 HC RX 250 WO HCPCS

## 2023-03-16 PROCEDURE — 2580000003 HC RX 258: Performed by: INTERNAL MEDICINE

## 2023-03-16 PROCEDURE — 2709999900 HC NON-CHARGEABLE SUPPLY: Performed by: INTERNAL MEDICINE

## 2023-03-16 PROCEDURE — 7100000010 HC PHASE II RECOVERY - FIRST 15 MIN: Performed by: INTERNAL MEDICINE

## 2023-03-16 PROCEDURE — 3609010600 HC COLONOSCOPY POLYPECTOMY SNARE/COLD BIOPSY: Performed by: INTERNAL MEDICINE

## 2023-03-16 RX ORDER — PROPOFOL 10 MG/ML
INJECTION, EMULSION INTRAVENOUS CONTINUOUS PRN
Status: DISCONTINUED | OUTPATIENT
Start: 2023-03-16 | End: 2023-03-16 | Stop reason: SDUPTHER

## 2023-03-16 RX ORDER — LIDOCAINE HYDROCHLORIDE 20 MG/ML
INJECTION, SOLUTION EPIDURAL; INFILTRATION; INTRACAUDAL; PERINEURAL PRN
Status: DISCONTINUED | OUTPATIENT
Start: 2023-03-16 | End: 2023-03-16 | Stop reason: SDUPTHER

## 2023-03-16 RX ORDER — SODIUM CHLORIDE, SODIUM LACTATE, POTASSIUM CHLORIDE, CALCIUM CHLORIDE 600; 310; 30; 20 MG/100ML; MG/100ML; MG/100ML; MG/100ML
INJECTION, SOLUTION INTRAVENOUS CONTINUOUS
Status: DISCONTINUED | OUTPATIENT
Start: 2023-03-16 | End: 2023-03-16 | Stop reason: HOSPADM

## 2023-03-16 RX ORDER — PROPOFOL 10 MG/ML
INJECTION, EMULSION INTRAVENOUS PRN
Status: DISCONTINUED | OUTPATIENT
Start: 2023-03-16 | End: 2023-03-16 | Stop reason: SDUPTHER

## 2023-03-16 RX ADMIN — SODIUM CHLORIDE, POTASSIUM CHLORIDE, SODIUM LACTATE AND CALCIUM CHLORIDE: 600; 310; 30; 20 INJECTION, SOLUTION INTRAVENOUS at 11:18

## 2023-03-16 RX ADMIN — PROPOFOL 100 MG: 10 INJECTION, EMULSION INTRAVENOUS at 11:44

## 2023-03-16 RX ADMIN — PROPOFOL 100 MG: 10 INJECTION, EMULSION INTRAVENOUS at 11:49

## 2023-03-16 RX ADMIN — PROPOFOL 125 MCG/KG/MIN: 10 INJECTION, EMULSION INTRAVENOUS at 11:44

## 2023-03-16 RX ADMIN — LIDOCAINE HYDROCHLORIDE 50 MG: 20 INJECTION, SOLUTION EPIDURAL; INFILTRATION; INTRACAUDAL; PERINEURAL at 11:44

## 2023-03-16 ASSESSMENT — PAIN SCALES - GENERAL: PAINLEVEL_OUTOF10: 0

## 2023-03-16 ASSESSMENT — PAIN - FUNCTIONAL ASSESSMENT: PAIN_FUNCTIONAL_ASSESSMENT: 0-10

## 2023-03-16 NOTE — PROCEDURES
Colonoscopy REPORT    Patient:  Archie Primrose                  1975    Referring Physician:  Shobha Ferreira    Endoscopist: Maurice Jaquez     Indication:  Screening - average risk     Medications:  MAC      Pre-Anesthesia Assessment:  I have reviewed and am in agreement with patient history and medication, including previous response to sedation. Prior to the procedure, a History and Physical was performed, and patient medications and allergies were reviewed. The risks and benefits of the procedure and the sedation options and risks were discussed with the patient. Complications included but were not limited to infection, bleeding, perforation, death, and missed lesions. All questions were answered and informed consent was obtained by the patient. Patient identification and proposed procedure were verified by the physician and the nurse in the pre-procedure area and in the procedure room. Mallampatti: II  ASA Grade Assessment: 2    After reviewing the risks and benefits, the patient was deemed in satisfactory condition to undergo the procedure. The anesthesia plan was to use MAC sedation. Immediately prior to administration of medications, the patient was re-assessed for adequacy to receive sedatives and a time out was performed. Patient and healthcare providers were in agreement it was the correct patient and procedure. The heart rate, respiratory rate, oxygen saturations, blood pressure, adequacy of pulmonary ventilation, and response to care were monitored throughout the procedure. The physical status of the patient was re-assessed after the procedure. After adequate sedation was achieved in stepwise fashion a rectal examination was performed and was normal.     The pediatric colonoscope was then advanced atraumatically into the rectum and advanced without difficulty to the cecum.   The cecum was identified by the appendiceal orifice the ileocecal valve and other normal anatomy and a picture was obtained. The scope was then withdrawn (>6minutes) with close inspection of the mucosa in a circumferential manor. Retroflexed views under the ICV and of the right colon obtained. TI normal for 5cms. 2mm ascending polyp removed with cold snare. The remainder of the colon is normal.     Retroflexed views of the rectum normal.     No immediate complications. The preparation was good. Estimated blood loss none    Impression:  1 polyp removed  Plan:  The patient is aware it is their responsibility to call for biopsy results in 7 days. Repeat colonoscopy in 5 years.

## 2023-03-16 NOTE — PROGRESS NOTES
Ambulatory Surgery/Procedure Discharge Note    Vitals:    03/16/23 1220   BP: 102/65   Pulse: 72   Resp: 16   Temp:    SpO2: 100%       In: 100 [I.V.:100]  Out: -     Restroom use offered before discharge. Yes    Pain assessment:  none  Pain Level: 0    Pt states readiness to discharge home. No complaints of pain or nausea. Dr. Carlito Lima spoke with . Discharge instructions discussed with patient and patients, , Sherryle Dauer. Pt to vehicle via wheelchair. Patient discharged to home/self care.  Patient discharged via wheel chair by transporter to waiting family/S.O.       3/16/2023 12:53 PM

## 2023-03-16 NOTE — H&P
Pre-operative History and Physical    Patient: Jalil Lee  : 1975     History Obtained From:  patient, electronic medical record    HISTORY OF PRESENT ILLNESS:    The patient is a 41 y.o. female who presents for a colonoscopy for screening. Past Medical History:        Diagnosis Date    Depression     during divorce    Gestational diabetes 2011     Past Surgical History:        Procedure Laterality Date    ENDOMETRIAL ABLATION  2017    Dr. Salo Lamar     Medications Prior to Admission:   No current facility-administered medications on file prior to encounter. Current Outpatient Medications on File Prior to Encounter   Medication Sig Dispense Refill    Galcanezumab-gnlm (EMGALITY) 120 MG/ML SOAJ Inject 120 mg into the skin every 30 days 3 Adjustable Dose Pre-filled Pen Syringe 3    SUMAtriptan (IMITREX) 100 MG tablet TAKE ONE TABLET AT THE ONSET OF A MIGRAINE REPEAT IN 2 HOURS AS NEEDED MAX 2 TABS IN 24 HOURS. 9 tablet 5    ibuprofen (ADVIL;MOTRIN) 200 MG CAPS Take 4 capsules by mouth 3 times daily as needed. aspirin-acetaminophen-caffeine (EXCEDRIN MIGRAINE) 250-250-65 MG per tablet Take 1 tablet by mouth every 6 hours as needed. Allergies:  Topamax [topiramate]    History of allergic reaction to anesthesia:  No    Social History:   TOBACCO:   reports that she has never smoked. She has never used smokeless tobacco.  ETOH:   reports current alcohol use. DRUGS:   reports no history of drug use.   Family History:       Problem Relation Age of Onset    Migraines Mother     Migraines Sister     Other Father         melanoma    Prostate Cancer Father     Hypertension Mother     Elevated Lipids Mother     Hypertension Father     Elevated Lipids Father        PHYSICAL EXAM:      /75   Pulse 76   Temp 97.3 °F (36.3 °C) (Temporal)   Resp 14   Ht 5' 4\" (1.626 m)   Wt 140 lb (63.5 kg)   SpO2 99%   BMI 24.03 kg/m²  I        Heart:  No m/r/g +s1/s2 RRR    Lungs:  CTA bilaterally    Abdomen:  Soft, nontender, non distended; +bs    ASA rdGrdrrdarddrderd:rd rd3rd Mallampati Class:  Class I: Soft palate, uvula, fauces, pillars visible  __________  Class II: Soft palate, uvula, fauces visible  ____x______   Class III: Soft palate, base of uvula visible  __________  Class IV: Hard palate only visible   __________      ASSESSMENT AND PLAN:    1. Patient is a 41 y.o. female here for colonoscopy with deep sedation  2. Procedure options, risks and benefits reviewed with patient. We specifically discussed that risks include, but are not limited to infection, bleeding, perforation, death, and missed lesions. Patient expresses understanding.

## 2023-03-16 NOTE — ANESTHESIA PRE PROCEDURE
Department of Anesthesiology  Preprocedure Note       Name:  Yenni Das   Age:  47 y.o.  :  1975                                          MRN:  [id number]         Date:  3/16/2023      Surgeon: Surgeon(s):  Junior Myles MD    Procedure: Procedure(s):  COLONOSCOPY DIAGNOSTIC    Medications prior to admission:   Prior to Admission medications    Medication Sig Start Date End Date Taking? Authorizing Provider   Galcanezumab-gnlm (EMGALITY) 120 MG/ML SOAJ Inject 120 mg into the skin every 30 days 23   Clau Roldan MD   SUMAtriptan (IMITREX) 100 MG tablet TAKE ONE TABLET AT THE ONSET OF A MIGRAINE REPEAT IN 2 HOURS AS NEEDED MAX 2 TABS IN 24 HOURS. 23   Clau Roldan MD   ibuprofen (ADVIL;MOTRIN) 200 MG CAPS Take 4 capsules by mouth 3 times daily as needed.      Historical Provider, MD   aspirin-acetaminophen-caffeine (EXCEDRIN MIGRAINE) 250-250-65 MG per tablet Take 1 tablet by mouth every 6 hours as needed.      Historical Provider, MD       Current medications:    No current facility-administered medications for this encounter.       Allergies:    Allergies   Allergen Reactions   • Topamax [Topiramate]      Joint pain       Problem List:    Patient Active Problem List   Diagnosis Code   • Migraine G43.909   • Mass of finger of left hand R22.32   • Nephrolithiasis N20.0       Past Medical History:        Diagnosis Date   • Depression     during divorce   • Gestational diabetes 2011       Past Surgical History:        Procedure Laterality Date   • ENDOMETRIAL ABLATION  2017    Dr. Cardoza       Social History:    Social History     Tobacco Use   • Smoking status: Never   • Smokeless tobacco: Never   Substance Use Topics   • Alcohol use: Yes     Comment: infrequent                                Counseling given: Not Answered      Vital Signs (Current): There were no vitals filed for this visit.                                           BP Readings from Last 3  Encounters:   01/12/23 100/70   05/20/19 (!) 108/59   05/11/18 103/63       NPO Status:                                                                                 BMI:   Wt Readings from Last 3 Encounters:   01/12/23 149 lb 12.8 oz (67.9 kg)   05/20/19 136 lb 6.4 oz (61.9 kg)   05/11/18 139 lb 3.2 oz (63.1 kg)     There is no height or weight on file to calculate BMI.    CBC:   Lab Results   Component Value Date/Time    WBC 8.1 01/05/2023 09:00 AM    RBC 4.56 01/05/2023 09:00 AM    HGB 13.5 01/05/2023 09:00 AM    HCT 40.8 01/05/2023 09:00 AM    MCV 89.4 01/05/2023 09:00 AM    RDW 13.5 01/05/2023 09:00 AM     01/05/2023 09:00 AM       CMP:   Lab Results   Component Value Date/Time     01/05/2023 09:00 AM    K 4.9 01/05/2023 09:00 AM     01/05/2023 09:00 AM    CO2 22 01/05/2023 09:00 AM    BUN 12 01/05/2023 09:00 AM    CREATININE 0.7 01/05/2023 09:00 AM    GFRAA >60 08/28/2020 08:37 AM    AGRATIO 1.5 01/05/2023 09:00 AM    LABGLOM >60 01/05/2023 09:00 AM    GLUCOSE 86 01/05/2023 09:00 AM    PROT 6.6 01/05/2023 09:00 AM    CALCIUM 9.3 01/05/2023 09:00 AM    BILITOT 0.3 01/05/2023 09:00 AM    ALKPHOS 72 01/05/2023 09:00 AM    AST 16 01/05/2023 09:00 AM    ALT 15 01/05/2023 09:00 AM       POC Tests: No results for input(s): POCGLU, POCNA, POCK, POCCL, POCBUN, POCHEMO, POCHCT in the last 72 hours.     Coags: No results found for: PROTIME, INR, APTT    HCG (If Applicable): No results found for: PREGTESTUR, PREGSERUM, HCG, HCGQUANT     ABGs: No results found for: PHART, PO2ART, ETI4NZE, WFV0DXR, BEART, G5KMPSCH     Type & Screen (If Applicable):  No results found for: LABABO, LABRH    Drug/Infectious Status (If Applicable):  No results found for: HIV, HEPCAB    COVID-19 Screening (If Applicable):   Lab Results   Component Value Date/Time    COVID19 NOT DETECTED 01/08/2021 04:30 PM           Anesthesia Evaluation  Patient summary reviewed and Nursing notes reviewed no history of anesthetic complications:   Airway: Mallampati: III  TM distance: >3 FB   Neck ROM: full  Mouth opening: > = 3 FB   Dental: normal exam         Pulmonary:normal exam                               Cardiovascular:  Exercise tolerance: good (>4 METS),       (-)  angina, orthopnea and PND      Rhythm: regular  Rate: normal           Beta Blocker:  Not on Beta Blocker         Neuro/Psych:   (+) headaches:, psychiatric history:            GI/Hepatic/Renal:             Endo/Other:    (+) DiabetesType II DM, , .                 Abdominal:       Abdomen: soft. Vascular: Other Findings:           Anesthesia Plan      MAC     ASA 2       Induction: intravenous. MIPS: Postoperative opioids intended and Prophylactic antiemetics administered. Anesthetic plan and risks discussed with patient. Use of blood products discussed with patient whom consented to blood products. Plan discussed with CRNA and attending.                     Krys Sandoval DO   3/16/2023

## 2023-03-16 NOTE — ANESTHESIA POSTPROCEDURE EVALUATION
Department of Anesthesiology  Postprocedure Note    Patient: Viri Shafer  MRN: [id number]  YOB: 1975  Date of evaluation: 3/16/2023      Procedure Summary     Date: 03/16/23 Room / Location: [address]    Anesthesia Start: 3490 Anesthesia Stop: 1159    Procedure: COLONOSCOPY POLYPECTOMY SNARE/COLD BIOPSY Diagnosis:       Colon cancer screening      (screening)    Surgeons: Ramya Urena MD Responsible Provider: Kathi Yo DO    Anesthesia Type: MAC ASA Status: 2          Anesthesia Type: No value filed.     Ilia Phase I: Ilia Score: 10    Ilia Phase II:        Anesthesia Post Evaluation    Patient location during evaluation: PACU  Patient participation: complete - patient participated  Level of consciousness: awake  Pain score: 0  Airway patency: patent  Nausea & Vomiting: no nausea and no vomiting  Complications: no  Cardiovascular status: blood pressure returned to baseline  Respiratory status: acceptable  Hydration status: euvolemic  Multimodal analgesia pain management approach

## 2023-03-16 NOTE — DISCHARGE INSTRUCTIONS
ENDOSCOPY DISCHARGE INSTRUCTIONS:    Call the physician that did your procedure for any questions or concern:    [facility]: [phone]  DR. BRIDGETT WOOD      ACTIVITY:    There are potential side effects to the medications used for sedation and anesthesia during your procedure. These include:  Dizziness or light-headedness, confusion or memory loss, delayed reaction times, loss of coordination, nausea and vomiting. Because of your increased risk for injury, we ask that you observe the following precautions: For the next 24 hours,  DO NOT operate an automobile, bicycle, motorcycle, , power tools or large equipment of any kind. Do not drink alcohol, sign any legal documents or make any legal decisions for 24 hours. Do not bend your head over lower than your heart. DO sit on the side of bed/couch awhile before getting up. Plan on bedrest or quiet relaxation today. You may resume normal activities in 24 hours. DIET:    Your first meal today should be light, avoiding spicy and fatty foods. If you tolerate this first meal, then you may advance to your regular diet unless otherwise advised by your physician. NORMAL SYMPTOMS:  -Mild sore throat if youve had an EGD   -Gaseous discomfort    NOTIFY YOUR PHYSICIAN IF THESE SYMPTOMS OCCUR:  1. Fever (greater than 100)  5. Increased abdominal bloating  2. Severe pain    6. Excessive bleeding  3. Nausea and vomiting  7. Chest pain                                                                    4. Chills    8. Shortness of breath    ADDITIONAL INSTRUCTIONS:    Biopsy results: Call [address],[facility] for biopsy results in 1 week    Educational Information:    Impression:  1 polyp removed  Plan:  The patient is aware it is their responsibility to call for biopsy results in 7 days. Repeat colonoscopy in 5 years. Please review these discharge instructions this evening or tomorrow for  information you may have forgotten.             We want to thank you for choosing the [facility] as your health care provider. We always strive to provide you with excellent care while you are here. You may receive a survey in the mail regarding your care. We would appreciate you taking a few minutes of your time to complete this survey.

## (undated) DEVICE — TRAP SPEC RETRV CLR PLAS POLYP IN LN SUCT QUIK CTCH

## (undated) DEVICE — CANNULA SAMP CO2 AD GRN 7FT CO2 AND 7FT O2 TBNG UNIV CONN

## (undated) DEVICE — SNARES COLD OVAL 10MM THIN